# Patient Record
Sex: FEMALE | Race: ASIAN | NOT HISPANIC OR LATINO | Employment: UNEMPLOYED | URBAN - METROPOLITAN AREA
[De-identification: names, ages, dates, MRNs, and addresses within clinical notes are randomized per-mention and may not be internally consistent; named-entity substitution may affect disease eponyms.]

---

## 2024-09-17 ENCOUNTER — INITIAL PRENATAL (OUTPATIENT)
Dept: OBGYN | Facility: CLINIC | Age: 34
End: 2024-09-17
Payer: COMMERCIAL

## 2024-09-17 VITALS — WEIGHT: 104.8 LBS | SYSTOLIC BLOOD PRESSURE: 85 MMHG | DIASTOLIC BLOOD PRESSURE: 50 MMHG

## 2024-09-17 DIAGNOSIS — Z3A.16 16 WEEKS GESTATION OF PREGNANCY: ICD-10-CM

## 2024-09-17 PROCEDURE — 3074F SYST BP LT 130 MM HG: CPT | Performed by: OBSTETRICS & GYNECOLOGY

## 2024-09-17 PROCEDURE — 0501F PRENATAL FLOW SHEET: CPT | Performed by: OBSTETRICS & GYNECOLOGY

## 2024-09-17 PROCEDURE — 3078F DIAST BP <80 MM HG: CPT | Performed by: OBSTETRICS & GYNECOLOGY

## 2024-09-17 NOTE — PROGRESS NOTES
Pt here for NOB apt.     Last pap : has not had one, pt prefers to do pap at her PP visit  LMP = 5/28/24  HERMELINDA = 3/4/25  GA today = 16w0d  EPDS = 7  Pt is not interested in genetic testing - already had one done in [place].     Pt states she does not feel movement yet and wanted to know when she would start feeling movement. This is her first pregnancy.   Medical records with first trimester labs scanned in [place], labs were done in [place].     Phone/Pharm verified.   [phone]

## 2024-09-17 NOTE — PROGRESS NOTES
Establish Pregnancy Visit    CC: First OB Visit    HPI: Patient is a 34 y.o.  at 16 weeks based on her sure last menstrual period who presents for her first OB visit.  She is not yet feeling fetal movement.  She denies vaginal bleeding, denies nausea, denies vomiting.   She denies headaches, or urinary symptoms.  The patient had her initial prenatal care in [place].  She does have a 12-week ultrasound which confirms her dates.  Her prenatal labs also current with a normal NIPT as well.    DATING:     Procedure:     Findings:   Harmon intrauterine pregnancy @15 weeks 2 days on today's ultrasound.    Positive fetal cardiac activity      Impression:   Viable IUP @ 16w0d.  HERMELINDA by US of 25 by original 12-week ultrasound..  HERMELINDA by LMP: 3/ 4/25  Final HERMELINDA: 3/4/2025      GYN HX:   Last Pap: Never  Hx Moderate or Severe Dysplasia : no  Hx STD : no    OBSTETRIC HISTORY:  OB History    Para Term  AB Living   1             SAB IAB Ectopic Molar Multiple Live Births                    # Outcome Date GA Lbr Jesus Manuel/2nd Weight Sex Type Anes PTL Lv   1 Current                MEDICAL HISTORY:  History reviewed. No pertinent past medical history.    MEDICATIONS:  Current Outpatient Medications on File Prior to Visit   Medication Sig Dispense Refill    Prenatal MV-Min-Fe Fum-FA-DHA (PRENATAL 1 PO) Take  by mouth.       No current facility-administered medications on file prior to visit.       FAMILY HISTORY:  Family History   Problem Relation Age of Onset    Kidney stones Mother     Liver Cancer Maternal Grandmother     Lung Cancer Maternal Grandfather        SURGICAL HISTORY:  Past Surgical History:   Procedure Laterality Date    LACERATION REPAIR      right hand, tendon laceration    TONSILLECTOMY         ALLERGIES / REACTIONS:  Not on File             SOCIAL HISTORY:   reports that she has never smoked. She has never used smokeless tobacco. She reports that she does not drink alcohol and does not use  drugs.    ROS:   Gen: no fevers or chills, no significant weight loss or gain, excessive fatigue  Respiratory:  no cough or dyspnea  Cardiac:  no chest pain, no palpitations, no syncope  Breast: no breast discharge, pain, lump or skin changes  GI:  no heartburn, no abdominal pain, no nausea or vomiting  Urinary: no dysuria, urgency, frequency, incontinence   Psych: no depression or anxiety  Neuro: no migraines with aura, fainting spells, numbness or tingling  Extremities: no joint pain, persistently swollen ankles, recurrent leg cramps         PHYSICAL EXAMINATION:  Vital Signs: BP (!) 85/50   Wt 104 lb 12.8 oz   Constitutional: The patient is well developed and well nourished.  Psychiatric: Patient is oriented to time place and person.   Skin: No rash observed.  Neck: Neck appears symmetric. There are no masses or adenopathy present.  Heart: RRR without murmurs  Respiratory: normal effort  Abdomen: Soft, non-tender.  150  Pelvic: Patient declined    Extremeties: Legs are symmetric and without tenderness. There is no edema present.    ACOG SCREENING  Infection Prevention  1. High Risk For HIV: No 6. Rash Or Illness Since LMP: No     2. High Risk For Hepatitis B or C: No 7. History Of STD, GC, Chlamydia, HPV Syphilis: No     3. Live With Someone With TB Or Exposed To TB: No 8. Have a cat in the home?: No     4. Patient Or Partner Has A History Of Herpes: No 8a. Responsible for changing the litter?: No     5. History of Chicken Pox: Yes (Comment: MOB when she was a kid) 9. Other (See Comments Below): No   Comments: MOB is employed. FOB is involved. Pregnancy was planned.          Genetic Screening/Teratology Counseling- Includes patient, baby's father, or anyone in either family with:  Patient's age 35 years or older as of estimated date of delivery: Yes     Thalassemia (Italian, Greek, Mediterranean, or  background): MCV less than 80: No     Neural tube defect (Meningomyelocele, Spina bifida, or Anencephaly):  No     Congenital heart defect: No     Down syndrome: No     Scott-Sachs (Ashkenazi Oriental orthodox, Cajun, Spanish [place]): No     Canavan disease (Ashkenazi Oriental orthodox): No     Familial dysautonomia (Ashkenazi Oriental orthodox): No     Sickle cell disease or trait (): No     Hemophilia or other blood disorders: No     Muscular dystrophy: No    Cystic fibrosis: No     Cass's chorea: No     Mental retardation/autism: No     Other inherited genetic or chromosomal disorder: No     Maternal metabolic disorder (eg. Type 1 diabetes, PKU): No     Patient or baby's father had child with birth defects not listed above: No     Recurrent pregnancy loss, or a stillbirth: No     Medications (including supplements, vitamins, herbs, or OTC drugs)/illicit/recreational drugs/alcohol since last menstrual period: No                 ASSESSMENT AND PLAN:  34 y.o.  at 16 weeks based on LMP and a 12-week ultrasound.  An ultrasound was performed today which placed her at approximately 15 weeks 2 days which is consistent with her original due date.  The patient declines a pelvic exam at this point today and she was counseled regarding the recommendations for Pap smear for cervical cancer screening as well as other screening tests..  The patient was counseled regarding the course of prenatal care and she will be scheduled for a 4 week return OB visit.  She is also scheduled for an anatomy scan at 20 weeks as well.  Reassurance was provided regarding her lack of fetal movement.    Pregnancy Problems (from 24 to present)       No problems associated with this episode.            1. 16 weeks gestation of pregnancy    - US-OB 2ND 3RD TRI COMPLETE; Future      - PNL reviewed or ordered and std screening not completed   - Dating reviewed: Dated by sure LMP 12 wk US  - Discussed options for genetic/aneuploidy testing and information given for pt to consider.  This was done in [place]  - Discussed recommendation for flu, Covid vaccine during  pregnancy  - Discussed office policies, prenatal care timeline, weight gain, diet and activity.  - Taking PNV.  - Increase water intake and encouraged healthy nutrition. Encouraged moderate exercise may continue into final trimester.     Return in 4 weeks for next prenatal visit    Ham Dick M.D.

## 2024-10-16 ENCOUNTER — ROUTINE PRENATAL (OUTPATIENT)
Dept: OBGYN | Facility: CLINIC | Age: 34
End: 2024-10-16
Payer: COMMERCIAL

## 2024-10-16 VITALS — WEIGHT: 109 LBS | DIASTOLIC BLOOD PRESSURE: 51 MMHG | SYSTOLIC BLOOD PRESSURE: 100 MMHG

## 2024-10-16 DIAGNOSIS — Z34.01 ENCOUNTER FOR SUPERVISION OF NORMAL FIRST PREGNANCY IN FIRST TRIMESTER: ICD-10-CM

## 2024-10-16 PROCEDURE — 0502F SUBSEQUENT PRENATAL CARE: CPT | Performed by: STUDENT IN AN ORGANIZED HEALTH CARE EDUCATION/TRAINING PROGRAM

## 2024-10-16 PROCEDURE — 3078F DIAST BP <80 MM HG: CPT | Performed by: STUDENT IN AN ORGANIZED HEALTH CARE EDUCATION/TRAINING PROGRAM

## 2024-10-16 PROCEDURE — 3074F SYST BP LT 130 MM HG: CPT | Performed by: STUDENT IN AN ORGANIZED HEALTH CARE EDUCATION/TRAINING PROGRAM

## 2024-10-22 ENCOUNTER — HOSPITAL ENCOUNTER (OUTPATIENT)
Dept: RADIOLOGY | Facility: MEDICAL CENTER | Age: 34
End: 2024-10-22
Attending: OBSTETRICS & GYNECOLOGY
Payer: COMMERCIAL

## 2024-10-22 DIAGNOSIS — Z3A.16 16 WEEKS GESTATION OF PREGNANCY: ICD-10-CM

## 2024-10-22 PROCEDURE — 76805 OB US >/= 14 WKS SNGL FETUS: CPT

## 2024-11-21 ENCOUNTER — ROUTINE PRENATAL (OUTPATIENT)
Dept: OBGYN | Facility: CLINIC | Age: 34
End: 2024-11-21
Payer: COMMERCIAL

## 2024-11-21 VITALS
DIASTOLIC BLOOD PRESSURE: 56 MMHG | HEIGHT: 63 IN | BODY MASS INDEX: 20.39 KG/M2 | SYSTOLIC BLOOD PRESSURE: 98 MMHG | WEIGHT: 115.1 LBS

## 2024-11-21 DIAGNOSIS — Z11.3 ROUTINE SCREENING FOR STI (SEXUALLY TRANSMITTED INFECTION): ICD-10-CM

## 2024-11-21 DIAGNOSIS — Z34.02 ENCOUNTER FOR SUPERVISION OF NORMAL FIRST PREGNANCY IN SECOND TRIMESTER: ICD-10-CM

## 2024-11-21 DIAGNOSIS — G89.29 CHRONIC BILATERAL LOW BACK PAIN WITHOUT SCIATICA: ICD-10-CM

## 2024-11-21 DIAGNOSIS — M54.50 CHRONIC BILATERAL LOW BACK PAIN WITHOUT SCIATICA: ICD-10-CM

## 2024-11-21 PROCEDURE — 3074F SYST BP LT 130 MM HG: CPT | Performed by: FAMILY MEDICINE

## 2024-11-21 PROCEDURE — 0502F SUBSEQUENT PRENATAL CARE: CPT | Performed by: FAMILY MEDICINE

## 2024-11-21 PROCEDURE — 3078F DIAST BP <80 MM HG: CPT | Performed by: FAMILY MEDICINE

## 2024-11-21 NOTE — PROGRESS NOTES
Prenatal Clinic Note    Renetta Mcmahan is a 34 y.o. female  at 25w2d by LMP c/w 12w US who presents for prenatal care.    Subjective      HPI: Patient is doing well. She is here for routine prenatal care. We discussed back pain in pregnancy and she will continue using her pillow, walking, and consider using belly band.    Uterine contractions denies  Leakage of fluid denies  vaginal bleeding denies  fetal movement affirms    Review of Symptoms:  Gen: denies fevers/chills, denies changes in weight  Pulm: denies shortness of breath, denies cough  CV: denies chest pain, denies palpitations  GI: -nausea, -vomiting, denies diarrhea or constipation  : denies dysuria, denies vaginal discharge or odor  Skin: denies rash    Histories      Prenatal care with [facility] starting at 16 with following problems:  Patient Active Problem List    Diagnosis Date Noted    Chronic bilateral low back pain without sciatica 2024       History reviewed. No pertinent past medical history.  Past Surgical History:   Procedure Laterality Date    LACERATION REPAIR      right hand, tendon laceration    TONSILLECTOMY       Family History   Problem Relation Age of Onset    Kidney stones Mother     Liver Cancer Maternal Grandmother     Lung Cancer Maternal Grandfather      OB History    Para Term  AB Living   1             SAB IAB Ectopic Molar Multiple Live Births                    # Outcome Date GA Lbr Jesus Manuel/2nd Weight Sex Type Anes PTL Lv   1 Current              Social History     Socioeconomic History    Marital status:      Spouse name: Not on file    Number of children: Not on file    Years of education: Not on file    Highest education level: Not on file   Occupational History    Not on file   Tobacco Use    Smoking status: Never    Smokeless tobacco: Never   Vaping Use    Vaping status: Never Used   Substance and Sexual Activity    Alcohol use: Never    Drug use: Never    Sexual activity: Yes   Other Topics  "Concern    Not on file   Social History Narrative    Not on file     Social Drivers of Health     Financial Resource Strain: Not on file   Food Insecurity: Not on file   Transportation Needs: Not on file   Physical Activity: Not on file   Stress: Not on file   Social Connections: Not on file   Intimate Partner Violence: Not on file   Housing Stability: Not on file     Not on File     Current Outpatient Medications:     Prenatal MV-Min-Fe Fum-FA-DHA (PRENATAL 1 PO), Take  by mouth., Disp: , Rfl:     Objective:      BP 98/56   Ht 5' 3\"   Wt 115 lb 1.6 oz   LMP 2024 (Exact Date)   BMI 20.39 kg/m²     Gen: Alert and oriented, No apparent distress.  Lungs: Breathing comfortably on room air, no cough  CV: Extremities are warm and well perfused, no BLE edema  MSK: Normal movement of extremities, gait normal    SVE:    FH: 26  FHR: 140    Lab Review        Invalid input(s): \"CBC PLTDF\", \"HIV\", \"CHLAM\"     Assessment and Plan:     Renetta Mcmahan is a 34 y.o. female  at 25w1d by LMP c/w 12w US who presents for prenatal care.    #SIUP at 25w1d by 12wk US, HERMELINDA 3/4/25  - prenatal care with [facility]  - Continue prenatal vitamins  - Quad screen in [place], neg, in media  - ordered missing prenatal labs    #pre-preg 101 lb, BMI 17.9  - BMI<18.5: IOM weight gain goal 28-40 lb for entire pregnancy  - total weight gain so far: 115  - encourage daily exercise, at least 30 minutes  - continue healthy diet with mostly fruit and vegetables  - Increase water intake and encouraged healthy nutrition.   - Encouraged moderate exercise may continue into final trimester.     #Nausea/Vomiting  - important to stay hydrated each day and eat small frequent snacks to avoid having an empty stomach  - Reviewed starting treatment for routine N/V of pregnancy if present with B6 10mg TID and Unisom 10 mg nightly.   - Call for prescription if this is not adequately treating N/V.    #pre-eclampsia prophylaxis  - not recommend aspirin 81mg once daily " for duration of pregnancy  - risk factors: nulliparity    #rubella: ordered  - MMR vaccine to be given postpartum    #varicella: ordered  - varicella vaccine to be given postpartum    #HIV NR, Trep NR, HBsAg NR, Hep C NR, GCCT ordered    #blood type O+, antibody screen ordered    #postpartum contraception: TBD    #Healthcare Maintenance  - Tdap after 28wk  - flu vaccine recommended  - COVID vaccines/boosters recommended  - RSV vaccine recommended 32-36wk  - Continue routine dental care twice per year    1. Encounter for supervision of normal first pregnancy in second trimester  - CBC WITHOUT DIFFERENTIAL; Future  - T.PALLIDUM AB JADEN (SCREENING); Future  - GLUCOSE 1HR GESTATIONAL; Future  - ANTIBODY SCREEN; Future  - RUBELLA ABS IGG; Future  - VARICELLA ZOSTER IGG AB; Future  - Quantiferon Gold TB (PPD); Future    2. Chronic bilateral low back pain without sciatica    3. Routine screening for STI (sexually transmitted infection)  - Chlamydia/GC, PCR (Urine); Future       Return to clinic in 3 weeks for routine prenatal care    Araseli Gifford MD, MPH

## 2024-11-21 NOTE — PROGRESS NOTES
Pt here for OB follow-up  Reports +FM  No VB, LOF, or UC  Phone number: [phone]  Pharmacy verified with pt.  Pt states no concerns at this time     Midtrimester labs pended

## 2024-11-23 ENCOUNTER — HOSPITAL ENCOUNTER (OUTPATIENT)
Dept: LAB | Facility: MEDICAL CENTER | Age: 34
End: 2024-11-23
Attending: FAMILY MEDICINE
Payer: COMMERCIAL

## 2024-11-23 DIAGNOSIS — Z11.3 ROUTINE SCREENING FOR STI (SEXUALLY TRANSMITTED INFECTION): ICD-10-CM

## 2024-11-23 DIAGNOSIS — Z34.02 ENCOUNTER FOR SUPERVISION OF NORMAL FIRST PREGNANCY IN SECOND TRIMESTER: ICD-10-CM

## 2024-11-23 LAB
BLD GP AB SCN SERPL QL: NORMAL
ERYTHROCYTE [DISTWIDTH] IN BLOOD BY AUTOMATED COUNT: 51 FL (ref 35.9–50)
GLUCOSE 1H P 50 G GLC PO SERPL-MCNC: 151 MG/DL (ref 70–139)
HCT VFR BLD AUTO: 35.7 % (ref 37–47)
HGB BLD-MCNC: 11.6 G/DL (ref 12–16)
MCH RBC QN AUTO: 29.4 PG (ref 27–33)
MCHC RBC AUTO-ENTMCNC: 32.5 G/DL (ref 32.2–35.5)
MCV RBC AUTO: 90.6 FL (ref 81.4–97.8)
PLATELET # BLD AUTO: 280 K/UL (ref 164–446)
PMV BLD AUTO: 10 FL (ref 9–12.9)
RBC # BLD AUTO: 3.94 M/UL (ref 4.2–5.4)
RUBV AB SER QL: 327 IU/ML
T PALLIDUM AB SER QL IA: NORMAL
WBC # BLD AUTO: 12.6 K/UL (ref 4.8–10.8)

## 2024-11-23 PROCEDURE — 82950 GLUCOSE TEST: CPT

## 2024-11-23 PROCEDURE — 86780 TREPONEMA PALLIDUM: CPT

## 2024-11-23 PROCEDURE — 86762 RUBELLA ANTIBODY: CPT

## 2024-11-23 PROCEDURE — 86850 RBC ANTIBODY SCREEN: CPT

## 2024-11-23 PROCEDURE — 86787 VARICELLA-ZOSTER ANTIBODY: CPT

## 2024-11-23 PROCEDURE — 86480 TB TEST CELL IMMUN MEASURE: CPT

## 2024-11-23 PROCEDURE — 36415 COLL VENOUS BLD VENIPUNCTURE: CPT

## 2024-11-23 PROCEDURE — 87491 CHLMYD TRACH DNA AMP PROBE: CPT

## 2024-11-23 PROCEDURE — 85027 COMPLETE CBC AUTOMATED: CPT

## 2024-11-23 PROCEDURE — 87591 N.GONORRHOEAE DNA AMP PROB: CPT

## 2024-11-24 LAB
C TRACH DNA SPEC QL NAA+PROBE: NEGATIVE
N GONORRHOEA DNA SPEC QL NAA+PROBE: NEGATIVE
SPECIMEN SOURCE: NORMAL

## 2024-11-25 DIAGNOSIS — R73.09 GLUCOSE TOLERANCE TEST ABNORMAL: ICD-10-CM

## 2024-11-25 LAB
GAMMA INTERFERON BACKGROUND BLD IA-ACNC: 0.02 IU/ML
M TB IFN-G BLD-IMP: NEGATIVE
M TB IFN-G CD4+ BCKGRND COR BLD-ACNC: 0.04 IU/ML
MITOGEN IGNF BCKGRD COR BLD-ACNC: 0.58 IU/ML
QFT TB2 - NIL TBQ2: 0.05 IU/ML
VZV IGG SER IA-ACNC: 3 S/CO

## 2024-12-03 ENCOUNTER — HOSPITAL ENCOUNTER (OUTPATIENT)
Dept: LAB | Facility: MEDICAL CENTER | Age: 34
End: 2024-12-03
Attending: FAMILY MEDICINE
Payer: COMMERCIAL

## 2024-12-03 DIAGNOSIS — R73.09 GLUCOSE TOLERANCE TEST ABNORMAL: ICD-10-CM

## 2024-12-03 PROCEDURE — 82952 GTT-ADDED SAMPLES: CPT

## 2024-12-03 PROCEDURE — 36415 COLL VENOUS BLD VENIPUNCTURE: CPT

## 2024-12-03 PROCEDURE — 82951 GLUCOSE TOLERANCE TEST (GTT): CPT

## 2024-12-04 LAB
GLUCOSE 1H P CHAL SERPL-MCNC: 165 MG/DL (ref 65–180)
GLUCOSE 2H P CHAL SERPL-MCNC: 145 MG/DL (ref 65–155)
GLUCOSE 3H P CHAL SERPL-MCNC: 122 MG/DL (ref 65–140)
GLUCOSE BS SERPL-MCNC: 71 MG/DL (ref 65–95)

## 2024-12-19 ENCOUNTER — ROUTINE PRENATAL (OUTPATIENT)
Dept: OBGYN | Facility: CLINIC | Age: 34
End: 2024-12-19
Payer: COMMERCIAL

## 2024-12-19 VITALS — WEIGHT: 121.2 LBS | BODY MASS INDEX: 21.47 KG/M2 | DIASTOLIC BLOOD PRESSURE: 51 MMHG | SYSTOLIC BLOOD PRESSURE: 92 MMHG

## 2024-12-19 DIAGNOSIS — Z34.03 ENCOUNTER FOR SUPERVISION OF NORMAL FIRST PREGNANCY IN THIRD TRIMESTER: ICD-10-CM

## 2024-12-19 PROCEDURE — 90471 IMMUNIZATION ADMIN: CPT

## 2024-12-19 PROCEDURE — 3078F DIAST BP <80 MM HG: CPT | Performed by: STUDENT IN AN ORGANIZED HEALTH CARE EDUCATION/TRAINING PROGRAM

## 2024-12-19 PROCEDURE — 0502F SUBSEQUENT PRENATAL CARE: CPT | Performed by: STUDENT IN AN ORGANIZED HEALTH CARE EDUCATION/TRAINING PROGRAM

## 2024-12-19 PROCEDURE — 3074F SYST BP LT 130 MM HG: CPT | Performed by: STUDENT IN AN ORGANIZED HEALTH CARE EDUCATION/TRAINING PROGRAM

## 2024-12-19 PROCEDURE — 90715 TDAP VACCINE 7 YRS/> IM: CPT | Mod: JZ

## 2024-12-19 RX ORDER — FERROUS SULFATE 325(65) MG
325 TABLET ORAL DAILY
COMMUNITY

## 2024-12-19 NOTE — LETTER
"Count Your Baby's Movements  Another step to a healthy delivery    Renetta Tanner Mcmahan  [facility]  Dept: [phone]    How Many Weeks Pregnant? 29w2d    Date to Begin Counting: today              How to use this chart    One way for your physician to keep track of your baby's health is by knowing how often the baby moves (or \"kicks\") in your womb.  You can help your physician to do this by using this chart every day.    Every day, you should see how many hours it takes for your baby to move 10 times.  Start in the morning, as soon as you get up.    First, write down the time your baby moves until you get to 10.  Check off one box every time your baby moves until you get to 10.  Write down the time you finished counting in the last column.  Total how long it took to count up all 10 movements.  Finally, fill in the box that shows how long this took.  After counting 10 movements, you no longer have to count any more that day.  The next morning, just start counting again as soon as you get up.    What should you call a \"movement\"?  It is hard to say, because it will feel different from one mother to another and from one pregnancy to the next.  The important thing is that you count the movements the same way throughout your pregnancy.  If you have more questions, you should ask your physician.    Count carefully every day!  SAMPLE:  Week 28    How many hours did it take to feel 10 movements?       Start  Time     1     2     3     4     5     6     7     8     9     10   Finish Time   Mon 8:20           11:40   Tue Wed Thu               Fri               Sat               Sun                 IMPORTANT: You should contact your physician if it takes more than two hours for you to feel 10 movements.  Each morning, write down the time and start to count the movements of your baby.  Keep track by checking off one box every time you feel one movement.  When you have " "felt 10 \"kicks\", write down the time you finished counting in the last column.  Then fill in the   box (over the check nila) for the number of hours it took.  Be sure to read the complete instructions on the previous page.            "

## 2024-12-19 NOTE — LETTER
"Count Your Baby's Movements  Another step to a healthy delivery    Renetta Tanner Mcmahan  [facility]  Dept: [phone]    How Many Weeks Pregnant? 29w2d    Date to Begin Counting: Today 12/19/2024              How to use this chart    One way for your physician to keep track of your baby's health is by knowing how often the baby moves (or \"kicks\") in your womb.  You can help your physician to do this by using this chart every day.    Every day, you should see how many hours it takes for your baby to move 10 times.  Start in the morning, as soon as you get up.    First, write down the time your baby moves until you get to 10.  Check off one box every time your baby moves until you get to 10.  Write down the time you finished counting in the last column.  Total how long it took to count up all 10 movements.  Finally, fill in the box that shows how long this took.  After counting 10 movements, you no longer have to count any more that day.  The next morning, just start counting again as soon as you get up.    What should you call a \"movement\"?  It is hard to say, because it will feel different from one mother to another and from one pregnancy to the next.  The important thing is that you count the movements the same way throughout your pregnancy.  If you have more questions, you should ask your physician.    Count carefully every day!  SAMPLE:  Week 28    How many hours did it take to feel 10 movements?       Start  Time     1     2     3     4     5     6     7     8     9     10   Finish Time   Mon 8:20           11:40   Tue Wed Thu               Fri               Sat               Sun                 IMPORTANT: You should contact your physician if it takes more than two hours for you to feel 10 movements.  Each morning, write down the time and start to count the movements of your baby.  Keep track by checking off one box every time you feel one movement.  When " "you have felt 10 \"kicks\", write down the time you finished counting in the last column.  Then fill in the   box (over the check nila) for the number of hours it took.  Be sure to read the complete instructions on the previous page.            "

## 2024-12-19 NOTE — PROGRESS NOTES
Pt here today for OB follow up  Reports +FM  WT: 121.2 lb  BP: 92/51  Preferred pharmacy verified with pt.  MFM Appointment: not at this time   Pt states no complaints or concerns today   Good # [phone]  PANCHO sheet given and explained today   Tdap vaccine offered,   Pt declines BTL

## 2024-12-19 NOTE — LETTER
"Count Your Baby's Movements  Another step to a healthy delivery    Renetta Tanner Mcmahan  [facility]  Dept: [phone]    How Many Weeks Pregnant? 29w2d    Date to Begin Counting: Today               How to use this chart    One way for your physician to keep track of your baby's health is by knowing how often the baby moves (or \"kicks\") in your womb.  You can help your physician to do this by using this chart every day.    Every day, you should see how many hours it takes for your baby to move 10 times.  Start in the morning, as soon as you get up.    First, write down the time your baby moves until you get to 10.  Check off one box every time your baby moves until you get to 10.  Write down the time you finished counting in the last column.  Total how long it took to count up all 10 movements.  Finally, fill in the box that shows how long this took.  After counting 10 movements, you no longer have to count any more that day.  The next morning, just start counting again as soon as you get up.    What should you call a \"movement\"?  It is hard to say, because it will feel different from one mother to another and from one pregnancy to the next.  The important thing is that you count the movements the same way throughout your pregnancy.  If you have more questions, you should ask your physician.    Count carefully every day!  SAMPLE:  Week 28    How many hours did it take to feel 10 movements?       Start  Time     1     2     3     4     5     6     7     8     9     10   Finish Time   Mon 8:20           11:40   Tue Wed Thu               Fri               Sat               Sun                 IMPORTANT: You should contact your physician if it takes more than two hours for you to feel 10 movements.  Each morning, write down the time and start to count the movements of your baby.  Keep track by checking off one box every time you feel one movement.  When you have " "felt 10 \"kicks\", write down the time you finished counting in the last column.  Then fill in the   box (over the check nila) for the number of hours it took.  Be sure to read the complete instructions on the previous page.          "

## 2024-12-25 NOTE — PROGRESS NOTES
OB Visit Note - 29w2d     Pregnancy complicated by:  Patient Active Problem List   Diagnosis    Chronic bilateral low back pain without sciatica     SUBJECTIVE:  Renetta Mcmahan is a 34 y.o.,  at 29w2d who presents for pregnancy follow-up. She states the baby is moving.  Today she mentions some concerns regarding her size and weight gain.  She also is wondering if that is possible to have a female delivering provider.  The patient had an abnormal 1 hour glucose tolerance test and passed the 3-hour.    ROS:  She denies vaginal bleeding, leakage of fluid, or contractions.    She denies nausea or vomiting or headache    OBJECTIVE:  Vital Signs: BP 92/51   Wt 121 lb 3.2 oz   LMP 2024 (Exact Date)   BMI 21.47 kg/m²   Appearance/Psychiatric: to be in no distress  Constitutional: The patient is well nourished.  Respiratory:Respiratory effort is normal.  Gastrointestinal: Abdomen is soft, gravid, non-tendern,no rashes, heart tones are present, fundal height is consistent with dates  Extremities: no edema  FH: 29  FHR: 140s   Latest Reference Range & Units 24 09:20 24 09:33   WBC 4.8 - 10.8 K/uL 12.6 (H)    Hemoglobin 12.0 - 16.0 g/dL 11.6 (L)    Hematocrit 37.0 - 47.0 % 35.7 (L)    Platelet Count 164 - 446 K/uL 280    Baseline Glucose 65 - 95 mg/dL  71   Glucose 1 Hour 65 - 180 mg/dL  165   Glucose, Post Dose 70 - 139 mg/dL 151 (H)    Glucose 2 Hour 65 - 155 mg/dL  145   Glucose 3 Hour 65 - 140 mg/dL  122   (H): Data is abnormally high  (L): Data is abnormally low   Latest Reference Range & Units 24 08:10 24 08:15 24 09:20   QFT Gold Plus Negative    Negative   Rubella IgG Antibody IU/mL   327.00   Varicella Zoster IgG Ab S/CO   3.0   Syphilis, Treponemal Qual Non-Reactive    Non-Reactive   C. trachomatis by PCR Negative   Negative    Gc By Dna Probe Negative   Negative    Antibody Screen Scrn  NEG       1. Encounter for supervision of normal first pregnancy in second  trimester  Tdap Vaccine =>8YO IM        Renetta Mcmahan is a 34 y.o. female  at 29w2d.  Here for MADELINE.  Overall patient doing well.  Received Tdap vaccine.     Regarding weight gain concerns: she is gaining weight and has gained a total of about 20 pounds so far.  Her BMI was almost 18 to start and is normal for her ethnicity.  Her fundal height measurement is within normal limits today.  I reviewed that she is on track and gaining weight appropriately and measuring appropriately.  She does not have any difficulties keeping food down and therefore will likely have no difficulty gaining the appropriate amount of weight for her baby.  I discussed that if at any time her baby is measuring smaller during fundal height assessments  for example if she was measuring less than 27 today, then a growth scan would be indicated.    Regarding female delivering provider concerns: discussed that it is most likely she will have a female provider given that a majority of our providers are female, however there are some instances in which they may not be possible for example if there is only a male provider on or if the backup doctor for a midwife is needed and happens to be a male.  I encouraged her to make her wishes known when she presents for labor and delivery.  In all cases we do our very best to respect their wishes of our patient's desires.  Patient is understandable and agreeable.    Discussed routine travel restrictions with patient to include avoiding airlines after 35 weeks and 6 days mainly based on airline recommendations.  Reviewed that if going to be in a crowded place should always take routine precautions for infection to include wearing a mask and washing hands frequently.  Also reviewed that if she is going to be traveling for over 2 hours then it is recommended to get up and stretch legs and walk around if possible once every couple hours during longer travel.     -all questions answered and pt  agreeable to plan of care    The patient will follow up in 2 week(s). She was counseled to call or return for vaginal bleeding, regular contractions, leakage of fluid or decreased fetal movement.    Raisa Street DO, FACOG  [facility]

## 2025-01-06 ENCOUNTER — APPOINTMENT (OUTPATIENT)
Dept: OBGYN | Facility: CLINIC | Age: 35
End: 2025-01-06
Payer: COMMERCIAL

## 2025-02-03 ENCOUNTER — HOSPITAL ENCOUNTER (OUTPATIENT)
Facility: MEDICAL CENTER | Age: 35
End: 2025-02-03
Attending: NURSE PRACTITIONER
Payer: COMMERCIAL

## 2025-02-03 LAB — AMBIGUOUS DTTM AMBI4: NORMAL

## 2025-02-03 PROCEDURE — 87081 CULTURE SCREEN ONLY: CPT

## 2025-02-03 PROCEDURE — 87150 DNA/RNA AMPLIFIED PROBE: CPT

## 2025-02-05 LAB — GP B STREP DNA SPEC QL NAA+PROBE: NEGATIVE

## 2025-02-12 ENCOUNTER — OFFICE VISIT (OUTPATIENT)
Dept: MEDICAL GROUP | Facility: PHYSICIAN GROUP | Age: 35
End: 2025-02-12
Payer: COMMERCIAL

## 2025-02-12 VITALS
BODY MASS INDEX: 23.21 KG/M2 | HEART RATE: 98 BPM | DIASTOLIC BLOOD PRESSURE: 58 MMHG | OXYGEN SATURATION: 97 % | TEMPERATURE: 97.3 F | HEIGHT: 63 IN | WEIGHT: 131 LBS | SYSTOLIC BLOOD PRESSURE: 110 MMHG

## 2025-02-12 DIAGNOSIS — Z11.59 NEED FOR HEPATITIS C SCREENING TEST: ICD-10-CM

## 2025-02-12 DIAGNOSIS — Z00.00 HEALTHCARE MAINTENANCE: ICD-10-CM

## 2025-02-12 PROCEDURE — 3074F SYST BP LT 130 MM HG: CPT | Performed by: FAMILY MEDICINE

## 2025-02-12 PROCEDURE — 3078F DIAST BP <80 MM HG: CPT | Performed by: FAMILY MEDICINE

## 2025-02-12 PROCEDURE — 99203 OFFICE O/P NEW LOW 30 MIN: CPT | Performed by: FAMILY MEDICINE

## 2025-02-12 ASSESSMENT — PATIENT HEALTH QUESTIONNAIRE - PHQ9: CLINICAL INTERPRETATION OF PHQ2 SCORE: 0

## 2025-02-12 NOTE — PROGRESS NOTES
Verbal consent was acquired by the patient to use [employer] ambient listening note generation during this visit .    Subjective:     HPI:   History of Present Illness  The patient is a 34-year-old  1 para 0 female who presents today to [facility] care as a new patient. She is currently 37 weeks pregnant with her first child, a male, and has no specific complaints at this time. She is due for regular annual screening and multiple vaccinations, which will be discussed during today's visit.    The patient is under the obstetric care of Dr. Lashell Sutton and is planning for a natural vaginal delivery. An ultrasound is scheduled for today. She has gained approximately 30 pounds during her pregnancy and currently weighs 131 pounds. She does not plan to use contraception postpartum. She has experienced some lumbar pain during her pregnancy, which has improved with ambulation. Her current medications include prenatal vitamins, iron, and vitamin C, with no other medications reported. She has no known drug allergies. The patient denies any history of tobacco or nicotine use, recreational drug use, or alcohol abuse. She has not previously undergone a Pap smear. She does not regularly visit a dentist or ophthalmologist and has not followed up with any specialists other than her obstetrician-gynecologist. Her pulse rate typically ranges from the high 60s to 70s, and her blood pressure is usually in the 90s/60s, indicating normotension. She reports no peripheral edema or symptoms suggestive of preeclampsia. Her 1-hour glucose test was slightly elevated, but she passed the 3-hour glucose tolerance test.    Supplemental Information  Her surgical history includes a tonsillectomy and repair of a right-hand laceration.    SOCIAL HISTORY  She reports no history of smoking, alcohol intake, and drug use.    FAMILY HISTORY  Her maternal grandparents both had liver and lung issues. Her mother had kidney stones. Her father is  "healthy. She has one brother who is healthy. No family history of heart disease, high cholesterol, high blood pressure, diabetes, breast cancer, ovarian cancer, and colorectal cancer.    ALLERGIES  The patient has no known allergies.    MEDICATIONS  Current: Prenatal vitamins, iron, vitamin C    Health Maintenance: Completed    Objective:     Exam:  /58 (BP Location: Right arm, Patient Position: Sitting, BP Cuff Size: Adult)   Pulse 98   Temp 36.3 °C (97.3 °F) (Temporal)   Ht 1.6 m (5' 3\")   Wt 59.4 kg (131 lb)   LMP 05/28/2024 (Exact Date)   SpO2 97%   BMI 23.21 kg/m²  Body mass index is 23.21 kg/m².    Physical Exam  Vitals reviewed.   Constitutional:       Appearance: Normal appearance.   HENT:      Head: Normocephalic and atraumatic.      Right Ear: External ear normal.      Left Ear: External ear normal.      Nose: Nose normal.   Cardiovascular:      Rate and Rhythm: Normal rate and regular rhythm.      Pulses: Normal pulses.      Heart sounds: Normal heart sounds. No murmur heard.  Pulmonary:      Effort: Pulmonary effort is normal. No respiratory distress.      Breath sounds: Normal breath sounds. No wheezing.   Abdominal:      General: Abdomen is flat.      Palpations: Abdomen is soft.   Skin:     General: Skin is warm and dry.   Neurological:      Mental Status: She is alert and oriented to person, place, and time.   Psychiatric:         Mood and Affect: Mood normal.         Behavior: Behavior normal.             Results      Assessment & Plan:     1. Healthcare maintenance  CBC WITHOUT DIFFERENTIAL    TSH WITH REFLEX TO FT4    HEMOGLOBIN A1C    Lipid Profile    HEP C VIRUS ANTIBODY    Comp Metabolic Panel      2. Need for hepatitis C screening test  HEP C VIRUS ANTIBODY          Assessment & Plan  1. Health maintenance.  Her vital signs are within normal limits, with the exception of a slightly elevated pulse rate, which is not a cause for concern. Her oxygen saturation levels are " satisfactory, and she is afebrile. Her 1-hour glucose tolerance test showed a slight elevation, but she successfully passed the 3-hour test, indicating no gestational diabetes. She has no signs of ankle edema or preeclampsia. She is advised to maintain a nutritious diet for her and the baby's health. She is also encouraged to undergo a Pap smear with her OB-GYN post-delivery. Regular dental cleanings are recommended to prevent potential systemic infections. Annual ophthalmological check-ups are also suggested. A comprehensive panel of laboratory tests will be ordered, including thyroid function, A1c, electrolytes, kidney function, liver function, CBC, cholesterol, and hepatitis C screening. These tests can be conducted post-delivery. She is advised to fast for 8 to 10 hours prior to the lab work.        Return in about 1 year (around 2/12/2026) for Annual/wellness visit.    Please note that this dictation was created using voice recognition software. I have made every reasonable attempt to correct obvious errors, but I expect that there are errors of grammar and possibly content that I did not discover before finalizing the note.    Vi Lyons MD  Family Medicine and Non - Operative Sports Medicine   [facility] [address]

## 2025-03-01 ENCOUNTER — ANESTHESIA EVENT (OUTPATIENT)
Dept: ANESTHESIOLOGY | Facility: MEDICAL CENTER | Age: 35
End: 2025-03-01
Payer: COMMERCIAL

## 2025-03-01 ENCOUNTER — HOSPITAL ENCOUNTER (INPATIENT)
Facility: MEDICAL CENTER | Age: 35
LOS: 2 days | End: 2025-03-03
Attending: OBSTETRICS & GYNECOLOGY | Admitting: OBSTETRICS & GYNECOLOGY
Payer: COMMERCIAL

## 2025-03-01 ENCOUNTER — ANESTHESIA (OUTPATIENT)
Dept: ANESTHESIOLOGY | Facility: MEDICAL CENTER | Age: 35
End: 2025-03-01
Payer: COMMERCIAL

## 2025-03-01 LAB
BASOPHILS # BLD AUTO: 0.4 % (ref 0–1.8)
BASOPHILS # BLD: 0.06 K/UL (ref 0–0.12)
EOSINOPHIL # BLD AUTO: 0.03 K/UL (ref 0–0.51)
EOSINOPHIL NFR BLD: 0.2 % (ref 0–6.9)
ERYTHROCYTE [DISTWIDTH] IN BLOOD BY AUTOMATED COUNT: 47.1 FL (ref 35.9–50)
HCT VFR BLD AUTO: 47 % (ref 37–47)
HGB BLD-MCNC: 15.2 G/DL (ref 12–16)
HOLDING TUBE BB 8507: NORMAL
IMM GRANULOCYTES # BLD AUTO: 0.15 K/UL (ref 0–0.11)
IMM GRANULOCYTES NFR BLD AUTO: 1 % (ref 0–0.9)
LYMPHOCYTES # BLD AUTO: 1.41 K/UL (ref 1–4.8)
LYMPHOCYTES NFR BLD: 9.1 % (ref 22–41)
MCH RBC QN AUTO: 29.1 PG (ref 27–33)
MCHC RBC AUTO-ENTMCNC: 32.3 G/DL (ref 32.2–35.5)
MCV RBC AUTO: 90 FL (ref 81.4–97.8)
MONOCYTES # BLD AUTO: 0.68 K/UL (ref 0–0.85)
MONOCYTES NFR BLD AUTO: 4.4 % (ref 0–13.4)
NEUTROPHILS # BLD AUTO: 13.1 K/UL (ref 1.82–7.42)
NEUTROPHILS NFR BLD: 84.9 % (ref 44–72)
NRBC # BLD AUTO: 0 K/UL
NRBC BLD-RTO: 0 /100 WBC (ref 0–0.2)
PLATELET # BLD AUTO: 221 K/UL (ref 164–446)
PMV BLD AUTO: 10 FL (ref 9–12.9)
RBC # BLD AUTO: 5.22 M/UL (ref 4.2–5.4)
T PALLIDUM AB SER QL IA: NORMAL
WBC # BLD AUTO: 15.4 K/UL (ref 4.8–10.8)

## 2025-03-01 PROCEDURE — 700111 HCHG RX REV CODE 636 W/ 250 OVERRIDE (IP): Performed by: ANESTHESIOLOGY

## 2025-03-01 PROCEDURE — 0KQM0ZZ REPAIR PERINEUM MUSCLE, OPEN APPROACH: ICD-10-PCS | Performed by: OBSTETRICS & GYNECOLOGY

## 2025-03-01 PROCEDURE — 700105 HCHG RX REV CODE 258: Performed by: OBSTETRICS & GYNECOLOGY

## 2025-03-01 PROCEDURE — 700101 HCHG RX REV CODE 250: Performed by: ANESTHESIOLOGY

## 2025-03-01 PROCEDURE — 770002 HCHG ROOM/CARE - OB PRIVATE (112)

## 2025-03-01 PROCEDURE — 59409 OBSTETRICAL CARE: CPT

## 2025-03-01 PROCEDURE — 85025 COMPLETE CBC W/AUTO DIFF WBC: CPT

## 2025-03-01 PROCEDURE — 303615 HCHG EPIDURAL/SPINAL ANESTHESIA FOR LABOR

## 2025-03-01 PROCEDURE — 36415 COLL VENOUS BLD VENIPUNCTURE: CPT

## 2025-03-01 PROCEDURE — 700111 HCHG RX REV CODE 636 W/ 250 OVERRIDE (IP): Mod: JZ

## 2025-03-01 PROCEDURE — 700102 HCHG RX REV CODE 250 W/ 637 OVERRIDE(OP): Performed by: OBSTETRICS & GYNECOLOGY

## 2025-03-01 PROCEDURE — 304965 HCHG RECOVERY SERVICES

## 2025-03-01 PROCEDURE — 302449 STATCHG TRIAGE ONLY (STATISTIC)

## 2025-03-01 PROCEDURE — 86780 TREPONEMA PALLIDUM: CPT

## 2025-03-01 PROCEDURE — A9270 NON-COVERED ITEM OR SERVICE: HCPCS | Performed by: OBSTETRICS & GYNECOLOGY

## 2025-03-01 PROCEDURE — 700105 HCHG RX REV CODE 258: Performed by: ANESTHESIOLOGY

## 2025-03-01 PROCEDURE — 700111 HCHG RX REV CODE 636 W/ 250 OVERRIDE (IP): Mod: JZ | Performed by: OBSTETRICS & GYNECOLOGY

## 2025-03-01 RX ORDER — MISOPROSTOL 200 UG/1
600 TABLET ORAL
Status: DISCONTINUED | OUTPATIENT
Start: 2025-03-01 | End: 2025-03-03 | Stop reason: HOSPADM

## 2025-03-01 RX ORDER — LIDOCAINE HYDROCHLORIDE 10 MG/ML
20 INJECTION, SOLUTION INFILTRATION; PERINEURAL
Status: DISCONTINUED | OUTPATIENT
Start: 2025-03-01 | End: 2025-03-01 | Stop reason: HOSPADM

## 2025-03-01 RX ORDER — LIDOCAINE HYDROCHLORIDE AND EPINEPHRINE 15; 5 MG/ML; UG/ML
INJECTION, SOLUTION EPIDURAL
Status: COMPLETED | OUTPATIENT
Start: 2025-03-01 | End: 2025-03-01

## 2025-03-01 RX ORDER — IBUPROFEN 800 MG/1
800 TABLET, FILM COATED ORAL
Status: DISCONTINUED | OUTPATIENT
Start: 2025-03-01 | End: 2025-03-01 | Stop reason: HOSPADM

## 2025-03-01 RX ORDER — SODIUM CHLORIDE, SODIUM LACTATE, POTASSIUM CHLORIDE, CALCIUM CHLORIDE 600; 310; 30; 20 MG/100ML; MG/100ML; MG/100ML; MG/100ML
INJECTION, SOLUTION INTRAVENOUS CONTINUOUS
Status: DISCONTINUED | OUTPATIENT
Start: 2025-03-01 | End: 2025-03-03 | Stop reason: HOSPADM

## 2025-03-01 RX ORDER — VITAMIN A ACETATE, BETA CAROTENE, ASCORBIC ACID, CHOLECALCIFEROL, .ALPHA.-TOCOPHEROL ACETATE, DL-, THIAMINE MONONITRATE, RIBOFLAVIN, NIACINAMIDE, PYRIDOXINE HYDROCHLORIDE, FOLIC ACID, CYANOCOBALAMIN, CALCIUM CARBONATE, FERROUS FUMARATE, ZINC OXIDE, CUPRIC OXIDE 3080; 12; 120; 400; 1; 1.84; 3; 20; 22; 920; 25; 200; 27; 10; 2 [IU]/1; UG/1; MG/1; [IU]/1; MG/1; MG/1; MG/1; MG/1; MG/1; [IU]/1; MG/1; MG/1; MG/1; MG/1; MG/1
1 TABLET, FILM COATED ORAL EVERY MORNING
Status: DISCONTINUED | OUTPATIENT
Start: 2025-03-02 | End: 2025-03-03 | Stop reason: HOSPADM

## 2025-03-01 RX ORDER — SODIUM CHLORIDE, SODIUM LACTATE, POTASSIUM CHLORIDE, CALCIUM CHLORIDE 600; 310; 30; 20 MG/100ML; MG/100ML; MG/100ML; MG/100ML
2000 INJECTION, SOLUTION INTRAVENOUS PRN
Status: DISCONTINUED | OUTPATIENT
Start: 2025-03-01 | End: 2025-03-03 | Stop reason: HOSPADM

## 2025-03-01 RX ORDER — HALOPERIDOL 5 MG/ML
1 INJECTION INTRAMUSCULAR
Status: CANCELLED | OUTPATIENT
Start: 2025-03-01

## 2025-03-01 RX ORDER — TERBUTALINE SULFATE 1 MG/ML
0.25 INJECTION SUBCUTANEOUS
Status: DISCONTINUED | OUTPATIENT
Start: 2025-03-01 | End: 2025-03-01 | Stop reason: HOSPADM

## 2025-03-01 RX ORDER — CARBOPROST TROMETHAMINE 250 UG/ML
250 INJECTION, SOLUTION INTRAMUSCULAR
Status: DISCONTINUED | OUTPATIENT
Start: 2025-03-01 | End: 2025-03-03 | Stop reason: HOSPADM

## 2025-03-01 RX ORDER — OXYTOCIN 10 [USP'U]/ML
10 INJECTION, SOLUTION INTRAMUSCULAR; INTRAVENOUS
Status: DISCONTINUED | OUTPATIENT
Start: 2025-03-01 | End: 2025-03-01 | Stop reason: HOSPADM

## 2025-03-01 RX ORDER — ACETAMINOPHEN 500 MG
1000 TABLET ORAL EVERY 6 HOURS PRN
Status: DISCONTINUED | OUTPATIENT
Start: 2025-03-01 | End: 2025-03-03 | Stop reason: HOSPADM

## 2025-03-01 RX ORDER — MISOPROSTOL 200 UG/1
TABLET ORAL
Status: ACTIVE
Start: 2025-03-01 | End: 2025-03-02

## 2025-03-01 RX ORDER — OXYCODONE HYDROCHLORIDE 5 MG/1
5 TABLET ORAL EVERY 4 HOURS PRN
Status: DISCONTINUED | OUTPATIENT
Start: 2025-03-01 | End: 2025-03-03 | Stop reason: HOSPADM

## 2025-03-01 RX ORDER — IBUPROFEN 800 MG/1
800 TABLET, FILM COATED ORAL EVERY 8 HOURS PRN
Status: DISCONTINUED | OUTPATIENT
Start: 2025-03-01 | End: 2025-03-03 | Stop reason: HOSPADM

## 2025-03-01 RX ORDER — ONDANSETRON 2 MG/ML
INJECTION INTRAMUSCULAR; INTRAVENOUS
Status: COMPLETED
Start: 2025-03-01 | End: 2025-03-01

## 2025-03-01 RX ORDER — SODIUM CHLORIDE, SODIUM LACTATE, POTASSIUM CHLORIDE, AND CALCIUM CHLORIDE .6; .31; .03; .02 G/100ML; G/100ML; G/100ML; G/100ML
1000 INJECTION, SOLUTION INTRAVENOUS
Status: COMPLETED | OUTPATIENT
Start: 2025-03-01 | End: 2025-03-01

## 2025-03-01 RX ORDER — BISACODYL 10 MG
10 SUPPOSITORY, RECTAL RECTAL PRN
Status: COMPLETED | OUTPATIENT
Start: 2025-03-01 | End: 2025-03-03

## 2025-03-01 RX ORDER — EPHEDRINE SULFATE 50 MG/ML
5 INJECTION, SOLUTION INTRAVENOUS
Status: DISCONTINUED | OUTPATIENT
Start: 2025-03-01 | End: 2025-03-01 | Stop reason: HOSPADM

## 2025-03-01 RX ORDER — DOCUSATE SODIUM 100 MG/1
100 CAPSULE, LIQUID FILLED ORAL 2 TIMES DAILY PRN
Status: DISCONTINUED | OUTPATIENT
Start: 2025-03-01 | End: 2025-03-03 | Stop reason: HOSPADM

## 2025-03-01 RX ORDER — ONDANSETRON 2 MG/ML
4 INJECTION INTRAMUSCULAR; INTRAVENOUS
Status: CANCELLED | OUTPATIENT
Start: 2025-03-01

## 2025-03-01 RX ORDER — DIPHENHYDRAMINE HYDROCHLORIDE 50 MG/ML
12.5 INJECTION, SOLUTION INTRAMUSCULAR; INTRAVENOUS
Status: CANCELLED | OUTPATIENT
Start: 2025-03-01

## 2025-03-01 RX ORDER — ROPIVACAINE HYDROCHLORIDE 2 MG/ML
INJECTION, SOLUTION EPIDURAL; INFILTRATION; PERINEURAL CONTINUOUS
Status: DISCONTINUED | OUTPATIENT
Start: 2025-03-01 | End: 2025-03-03 | Stop reason: HOSPADM

## 2025-03-01 RX ORDER — SODIUM CHLORIDE, SODIUM LACTATE, POTASSIUM CHLORIDE, AND CALCIUM CHLORIDE .6; .31; .03; .02 G/100ML; G/100ML; G/100ML; G/100ML
250 INJECTION, SOLUTION INTRAVENOUS PRN
Status: DISCONTINUED | OUTPATIENT
Start: 2025-03-01 | End: 2025-03-01 | Stop reason: HOSPADM

## 2025-03-01 RX ORDER — METHYLERGONOVINE MALEATE 0.2 MG/ML
0.2 INJECTION INTRAVENOUS
Status: DISCONTINUED | OUTPATIENT
Start: 2025-03-01 | End: 2025-03-03 | Stop reason: HOSPADM

## 2025-03-01 RX ORDER — ACETAMINOPHEN 500 MG
1000 TABLET ORAL
Status: DISCONTINUED | OUTPATIENT
Start: 2025-03-01 | End: 2025-03-01 | Stop reason: HOSPADM

## 2025-03-01 RX ORDER — ONDANSETRON 2 MG/ML
4 INJECTION INTRAMUSCULAR; INTRAVENOUS EVERY 4 HOURS PRN
Status: DISCONTINUED | OUTPATIENT
Start: 2025-03-01 | End: 2025-03-03 | Stop reason: HOSPADM

## 2025-03-01 RX ADMIN — SODIUM CHLORIDE, POTASSIUM CHLORIDE, SODIUM LACTATE AND CALCIUM CHLORIDE: 600; 310; 30; 20 INJECTION, SOLUTION INTRAVENOUS at 13:33

## 2025-03-01 RX ADMIN — ROPIVACAINE HYDROCHLORIDE: 2 INJECTION, SOLUTION EPIDURAL; INFILTRATION at 11:05

## 2025-03-01 RX ADMIN — SODIUM CHLORIDE, POTASSIUM CHLORIDE, SODIUM LACTATE AND CALCIUM CHLORIDE 1000 ML: 600; 310; 30; 20 INJECTION, SOLUTION INTRAVENOUS at 10:57

## 2025-03-01 RX ADMIN — LIDOCAINE HYDROCHLORIDE,EPINEPHRINE BITARTRATE 5 ML: 15; .005 INJECTION, SOLUTION EPIDURAL; INFILTRATION; INTRACAUDAL; PERINEURAL at 11:19

## 2025-03-01 RX ADMIN — OXYTOCIN 10 UNITS: 10 INJECTION, SOLUTION INTRAMUSCULAR; INTRAVENOUS at 19:38

## 2025-03-01 RX ADMIN — OXYTOCIN 10 UNITS: 10 INJECTION, SOLUTION INTRAMUSCULAR; INTRAVENOUS at 19:20

## 2025-03-01 RX ADMIN — FENTANYL CITRATE 100 MCG: 50 INJECTION, SOLUTION INTRAMUSCULAR; INTRAVENOUS at 09:07

## 2025-03-01 RX ADMIN — ACETAMINOPHEN 1000 MG: 500 TABLET ORAL at 22:43

## 2025-03-01 RX ADMIN — ONDANSETRON 4 MG: 2 INJECTION INTRAMUSCULAR; INTRAVENOUS at 09:39

## 2025-03-01 SDOH — ECONOMIC STABILITY: TRANSPORTATION INSECURITY
IN THE PAST 12 MONTHS, HAS LACK OF RELIABLE TRANSPORTATION KEPT YOU FROM MEDICAL APPOINTMENTS, MEETINGS, WORK OR FROM GETTING THINGS NEEDED FOR DAILY LIVING?: NO

## 2025-03-01 SDOH — ECONOMIC STABILITY: TRANSPORTATION INSECURITY
IN THE PAST 12 MONTHS, HAS THE LACK OF TRANSPORTATION KEPT YOU FROM MEDICAL APPOINTMENTS OR FROM GETTING MEDICATIONS?: NO

## 2025-03-01 ASSESSMENT — LIFESTYLE VARIABLES
AVERAGE NUMBER OF DAYS PER WEEK YOU HAVE A DRINK CONTAINING ALCOHOL: 0
ALCOHOL_USE: NO
HAVE YOU EVER FELT YOU SHOULD CUT DOWN ON YOUR DRINKING: NO
TOTAL SCORE: 0
EVER HAD A DRINK FIRST THING IN THE MORNING TO STEADY YOUR NERVES TO GET RID OF A HANGOVER: NO
EVER FELT BAD OR GUILTY ABOUT YOUR DRINKING: NO
ON A TYPICAL DAY WHEN YOU DRINK ALCOHOL HOW MANY DRINKS DO YOU HAVE: 0
HOW MANY TIMES IN THE PAST YEAR HAVE YOU HAD 5 OR MORE DRINKS IN A DAY: 0
TOTAL SCORE: 0
TOTAL SCORE: 0
HAVE PEOPLE ANNOYED YOU BY CRITICIZING YOUR DRINKING: NO
CONSUMPTION TOTAL: NEGATIVE

## 2025-03-01 ASSESSMENT — SOCIAL DETERMINANTS OF HEALTH (SDOH)
WITHIN THE LAST YEAR, HAVE YOU BEEN HUMILIATED OR EMOTIONALLY ABUSED IN OTHER WAYS BY YOUR PARTNER OR EX-PARTNER?: NO
WITHIN THE PAST 12 MONTHS, YOU WORRIED THAT YOUR FOOD WOULD RUN OUT BEFORE YOU GOT THE MONEY TO BUY MORE: NEVER TRUE
WITHIN THE LAST YEAR, HAVE YOU BEEN KICKED, HIT, SLAPPED, OR OTHERWISE PHYSICALLY HURT BY YOUR PARTNER OR EX-PARTNER?: NO
WITHIN THE LAST YEAR, HAVE TO BEEN RAPED OR FORCED TO HAVE ANY KIND OF SEXUAL ACTIVITY BY YOUR PARTNER OR EX-PARTNER?: NO
WITHIN THE LAST YEAR, HAVE YOU BEEN AFRAID OF YOUR PARTNER OR EX-PARTNER?: NO
IN THE PAST 12 MONTHS, HAS THE ELECTRIC, GAS, OIL, OR WATER COMPANY THREATENED TO SHUT OFF SERVICE IN YOUR HOME?: NO
WITHIN THE PAST 12 MONTHS, THE FOOD YOU BOUGHT JUST DIDN'T LAST AND YOU DIDN'T HAVE MONEY TO GET MORE: NEVER TRUE

## 2025-03-01 ASSESSMENT — PAIN DESCRIPTION - PAIN TYPE
TYPE: ACUTE PAIN

## 2025-03-01 ASSESSMENT — PAIN SCALES - GENERAL: PAIN_LEVEL: 0

## 2025-03-01 ASSESSMENT — PATIENT HEALTH QUESTIONNAIRE - PHQ9
2. FEELING DOWN, DEPRESSED, IRRITABLE, OR HOPELESS: NOT AT ALL
SUM OF ALL RESPONSES TO PHQ9 QUESTIONS 1 AND 2: 0
1. LITTLE INTEREST OR PLEASURE IN DOING THINGS: NOT AT ALL

## 2025-03-01 NOTE — PROGRESS NOTES
0715: Pt is a  at 39.4 weeks today, pt presents to L&D with c/o painful Uc's every 5 minutes beginning at 0400 today. Pt reports +FM, denies VB/LOF at this time. EFM and TOCO applied, VS taken, pt comfortable in bed at this time. SVE as seen in flowsheets.     0720: This RN updated Dr Kaur on pt status/VS/FHT/SVE. Admit orders received, see orders for details.     0815: Report given to Chanelle SANCHEZ, POC discussed.

## 2025-03-01 NOTE — ANESTHESIA PROCEDURE NOTES
Epidural Block    Date/Time: 3/1/2025 10:49 AM    Performed by: Jermaine Rowan M.D.  Authorized by: Jermaine Rowan M.D.    Patient Location:  OB  Start Time:  3/1/2025 10:49 AM  End Time:  3/1/2025 11:13 AM  Reason for Block: labor analgesia    patient identified, IV checked, site marked, risks and benefits discussed, surgical consent, monitors and equipment checked and pre-op evaluation    Patient Position:  Sitting  Prep: ChloraPrep, patient draped and sterile technique    Monitoring:  Blood pressure, continuous pulse oximetry and heart rate  Approach:  Midline  Location:  L3-L4  Injection Technique:  REMBERTO saline  Skin infiltration:  Lidocaine  Strength:  1%  Dose:  5ml  Needle Type:  Tuohy  Needle Gauge:  17 G  Needle Length:  3.5 in  Loss of resistance::  5  Catheter Size:  19 G  Catheter at Skin Depth:  10  Test Dose Result:  Negative   Single pass ;  25 g epncil poitn ot csf.  0.2 ropiv injected  2ml      
DISPLAY PLAN FREE TEXT

## 2025-03-01 NOTE — CARE PLAN
The patient is Stable - Low risk of patient condition declining or worsening    Shift Goals  Clinical Goals: cervical dilation and effacement  Patient Goals: pain control with epidural  Family Goals: stay informed    Progress made toward(s) clinical / shift goals:    Problem: Knowledge Deficit - L&D  Goal: Patient and family/caregivers will demonstrate understanding of plan of care, disease process/condition, diagnostic tests and medications  Outcome: Progressing  Note: Pt and support educated on labor process. Both educated using terminology consistent with their level of knowledge. Both able to verbalize basic understanding of fetal monitoring      Problem: Pain  Goal: Patient's pain will be alleviated or reduced to the patient’s comfort goal  Outcome: Progressing  Note: Pt desires epidural for pain control. IV pain medication used with success until anesthesia available for epidural placement        Patient is not progressing towards the following goals:

## 2025-03-01 NOTE — H&P
"Labor and Delivery History and Physical    CC: Contractions    HPI: Renetta Mcmahan is a 34-year-old  who presented to labor and delivery at 39 weeks 4 days with complaints of contractions.  She denies bleeding or leaking fluid.  She reports good fetal movement.  In OB triage she was found to be 4 cm dilated and regularly cheryl.    She received her prenatal care with Dr. Sutton.  She was a transfer of care at 31 weeks from [facility].  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  She had an anatomic scan completed by HR PC at 32 weeks and 1 day.  It was unremarkable.  She had follow-up biometry at 37 weeks and 1 day which demonstrated an estimated fetal weight in the 27th percentile with AC in the 15th percentile.    ROS: All other systems were reviewed and were found to be negative    PMH: Anemia  PSH: Tonsillectomy, tendon repair on her right hand  OB Hx: This is her first pregnancy  GYN Hx: She denies history sexually-transmitted infection including herpes simplex virus for herself or her spouse.  She does not have a documented Pap smear  FH: Liver cancer, high cholesterol, kidney stones, osteoporosis  SH: Denies tobacco, alcohol, or illicit drug use  Medications: Prenatal vitamins  Allergies: NKDA    /68   Pulse 65   Temp 36.1 °C (96.9 °F) (Temporal)   Resp 16   Ht 1.6 m (5' 2.99\")   Wt 60.6 kg (133 lb 9.6 oz)   LMP 2024 (Exact Date)   SpO2 98%   BMI 23.67 kg/m²     General: No apparent distress  Abdomen: Gravid, nontender  Extremities: No edema    FHT: Baseline of 115's, moderate variability present, accelerations present, decelerations absent    Pertinent lab results  ABO O+  GTT: 1 hour abnormal but 3-hour within normal limits  GBS negative  HIV negative  Hep B negative  Hep C negative  RPR negative  Rubella immune    Assessment:  Term IUP at 39 weeks 4 days  Labor    Plan:  Admit to labor and delivery for management of labor.  May have an epidural on " demand.  Anticipate vaginal delivery.    Herbie Kaur M.D.

## 2025-03-01 NOTE — PROGRESS NOTES
OB Progress Note    Comfortable with epidural. Started leaking clear fluid in triage. FHT with baseline of 120's. Moderate variability present. Accelerations present. Decelerations absent. [place] with contractions every 3 min. Anticipate vaginal delivery.

## 2025-03-01 NOTE — ANESTHESIA PREPROCEDURE EVALUATION
Date: 03/01/25  Procedure: Labor Epidural         Relevant Problems   No relevant active problems       Physical Exam    Airway   Mallampati: II  TM distance: >3 FB  Neck ROM: full       Cardiovascular - normal exam  Rhythm: regular  Rate: normal  (-) murmur     Dental - normal exam           Pulmonary - normal exam  Breath sounds clear to auscultation     Abdominal    Neurological - normal exam                   Anesthesia Plan    ASA 2       Plan - epidural   Neuraxial block will be labor analgesia                  Pertinent diagnostic labs and testing reviewed    Informed Consent:    Anesthetic plan and risks discussed with patient.

## 2025-03-01 NOTE — PROGRESS NOTES
OB Progress Note    No complaints. FHT with baseline of 130's. Moderate variability present. Accelerations present. Decelerations absent. [place] with contractions every 3 min. Discussed r/b of AROM of a palpable forebag. She stated understanding and desired to proceed. Performed clear. SVE 8/C/-1. Anticipate vaginal delivery.

## 2025-03-01 NOTE — PROGRESS NOTES
0815- Report from DANIEL Carrion. Care assumed.     1900- Report to DANIEL Corado. Care relinquished.

## 2025-03-01 NOTE — ADDENDUM NOTE
Problem: At Risk for Falls  Goal: Patient does not fall  Outcome: Monitoring/Evaluating progress  Goal: Patient takes action to control fall-related risks  Outcome: Monitoring/Evaluating progress     Problem: At Risk for Injury Due to Fall  Goal: Patient does not fall  Outcome: Monitoring/Evaluating progress  Goal: Takes action to control condition specific risks  Outcome: Monitoring/Evaluating progress  Goal: Verbalizes understanding of fall-related injury personal risks  Description: Document education using the patient education activity  Outcome: Monitoring/Evaluating progress     Problem: Pain  Goal: Acceptable pain level achieved/maintained at rest using appropriate pain scale for the patient  Outcome: Monitoring/Evaluating progress  Goal: Acceptable pain level achieved/maintained with activity using appropriate pain scale for the patient  Outcome: Monitoring/Evaluating progress  Goal: Acceptable pain level achieved/maintained without oversedation  Outcome: Monitoring/Evaluating progress     Problem: Impaired Physical Mobility  Goal: Functional status is maintained or returned to baseline during hospitalization  Outcome: Monitoring/Evaluating progress  Goal: Tolerates activity for discharge setting with no abnormal symptoms  Outcome: Monitoring/Evaluating progress     Problem: Skin Integrity Alteration  Goal: Skin remains intact with no new/deterioration of wound or pressure injury  Outcome: Monitoring/Evaluating progress  Goal: Participates in wound care activities  Outcome: Monitoring/Evaluating progress      Addended by: LISA HERNANDEZ on: 2/12/2025 07:15 AM     Modules accepted: Level of Service

## 2025-03-02 LAB
ERYTHROCYTE [DISTWIDTH] IN BLOOD BY AUTOMATED COUNT: 46.3 FL (ref 35.9–50)
HCT VFR BLD AUTO: 32.5 % (ref 37–47)
HGB BLD-MCNC: 10.7 G/DL (ref 12–16)
MCH RBC QN AUTO: 29.3 PG (ref 27–33)
MCHC RBC AUTO-ENTMCNC: 32.9 G/DL (ref 32.2–35.5)
MCV RBC AUTO: 89 FL (ref 81.4–97.8)
PLATELET # BLD AUTO: 210 K/UL (ref 164–446)
PMV BLD AUTO: 10.3 FL (ref 9–12.9)
RBC # BLD AUTO: 3.65 M/UL (ref 4.2–5.4)
WBC # BLD AUTO: 18.3 K/UL (ref 4.8–10.8)

## 2025-03-02 PROCEDURE — 85027 COMPLETE CBC AUTOMATED: CPT

## 2025-03-02 PROCEDURE — A9270 NON-COVERED ITEM OR SERVICE: HCPCS | Performed by: OBSTETRICS & GYNECOLOGY

## 2025-03-02 PROCEDURE — 770002 HCHG ROOM/CARE - OB PRIVATE (112)

## 2025-03-02 PROCEDURE — 36415 COLL VENOUS BLD VENIPUNCTURE: CPT

## 2025-03-02 PROCEDURE — 700102 HCHG RX REV CODE 250 W/ 637 OVERRIDE(OP): Performed by: OBSTETRICS & GYNECOLOGY

## 2025-03-02 RX ADMIN — IBUPROFEN 800 MG: 800 TABLET, FILM COATED ORAL at 09:04

## 2025-03-02 RX ADMIN — DOCUSATE SODIUM 100 MG: 100 CAPSULE, LIQUID FILLED ORAL at 20:22

## 2025-03-02 RX ADMIN — PRENATAL WITH FERROUS FUM AND FOLIC ACID 1 TABLET: 3080; 920; 120; 400; 22; 1.84; 3; 20; 10; 1; 12; 200; 27; 25; 2 TABLET ORAL at 07:31

## 2025-03-02 ASSESSMENT — EDINBURGH POSTNATAL DEPRESSION SCALE (EPDS)
THE THOUGHT OF HARMING MYSELF HAS OCCURRED TO ME: NEVER
I HAVE LOOKED FORWARD WITH ENJOYMENT TO THINGS: RATHER LESS THAN I USED TO
I HAVE BEEN ABLE TO LAUGH AND SEE THE FUNNY SIDE OF THINGS: AS MUCH AS I ALWAYS COULD
I HAVE FELT SAD OR MISERABLE: NOT VERY OFTEN
I HAVE BEEN SO UNHAPPY THAT I HAVE HAD DIFFICULTY SLEEPING: NOT VERY OFTEN
I HAVE BLAMED MYSELF UNNECESSARILY WHEN THINGS WENT WRONG: NOT VERY OFTEN
I HAVE FELT SCARED OR PANICKY FOR NO GOOD REASON: NO, NOT MUCH
I HAVE BEEN SO UNHAPPY THAT I HAVE BEEN CRYING: ONLY OCCASIONALLY
THINGS HAVE BEEN GETTING ON TOP OF ME: NO, MOST OF THE TIME I HAVE COPED QUITE WELL
I HAVE BEEN ANXIOUS OR WORRIED FOR NO GOOD REASON: YES, SOMETIMES

## 2025-03-02 ASSESSMENT — PAIN DESCRIPTION - PAIN TYPE: TYPE: ACUTE PAIN

## 2025-03-02 NOTE — PROGRESS NOTES
OB Progress Note    No pressure yet. FHT with baseline of 130's. Moderate variability present. Accelerations present. Decelerations absent. [place] with contractions every 3 min. SVE C/C/+1. Start pushing efforts.

## 2025-03-02 NOTE — LACTATION NOTE
This note was copied from a baby's chart.  Initial Consult:     History:  Renetta PETERS, is a 33 y/o  s/p  at 39+4 wks on 3/1/2025 at 1917.  MADDY HICKS, FRAN RN.    Baby boy, Rene, had BW 3040 g (6 lbs, 11.2 oz), AGA, IRINA(+).       History of BF:  Primip.     Report of Current Breastfeeding Status:  Renetta reports baby has been a bit sleepy since delivery, but seems to be more alert this a.m.  Renetta recorded 3 feedings since delivery last night, 15 mins duration. Denies breast/nipple tenderness, but reports breasts feel firmer today.     Breasts appear round/soft/symmetrical, nipples everted/intact/pliable.     She is cuddling baby skin-to-skin at time of visit. Baby has good color and tone and is lightly asleep. Voiding/stooling appropriate to DOL.     Breastfeeding Assistance:     Will attempt to revisit at next feeding for latch assist/assessment.      Provided breastfeeding education on: skin to skin, supply and demand, hunger cues, frequency/duration of breastfeeds, cluster feeding, shallow vs deep latch.     [facility] Breastfeeding Resources handout provided and outpatient lactation care and support [place] options reviewed.     [facility] pump form provided and discussed how to get pump through [facility]. Recommended not initiating pumping/bottle feeding EBM until after 4 wks of life unless supplementation medically indicated.     Encouraged to watch latch and hand expression videos on [employer].     1245:    Baby initially sleepy, awakened after skin-to-skin.    Demonstrated and taught Renetta how to perform hand expression. Yen able to hand express colostrum independently.     Assisted Yen to position baby at the left breast in the cross-cradle position. Taught Yen to place baby tummy to tummy and nipple to nose, how to wedge her breast, and hand express to tease baby to a wide gape and achieve deep latch.      Infant latched deep to breast and suckled with nutritive pattern, audible  swallows noted.  Yen denied pain with latch.    Also assisted Yen to latch baby in football hold at right breast with a deep latch and sustained nutritive pattern with audible swallows.      Provided breastfeeding education on: skin to skin, supply and demand, hunger cues, frequency/duration of breastfeeds, cluster feeding, shallow vs deep latch, and nutritive vs non-nutritive suck.    PLAN:    Skin to skin when either parent awake and alert.    Offer breast whenever hunger cues noted.    If baby sleeps more than 3 hours, wake baby and offer breast.    If baby not waking for feeding at least every 3 hours, hand express and spoon/cup feed back EBM to baby.    Watch latch and hand expression videos on [employer].

## 2025-03-02 NOTE — PROGRESS NOTES
1930: Report received for RN lunch break. Report received from Mickie JEFFREY RN  2000: Mickie JEFFREY RN back from break; care relinquished

## 2025-03-02 NOTE — ANESTHESIA POSTPROCEDURE EVALUATION
Patient: Renetta Mcmahan    Procedure Summary       Date: 03/01/25 Room / Location:     Anesthesia Start: 1049 Anesthesia Stop: 1917    Procedure: Labor Epidural Diagnosis:     Scheduled Providers:  Responsible Provider: Jermaine Rowan M.D.    Anesthesia Type: epidural ASA Status: 2            Final Anesthesia Type: epidural  Last vitals  BP   Blood Pressure: 98/57    Temp   36.9 °C (98.4 °F)    Pulse   83   Resp   16    SpO2   98 %      Anesthesia Post Evaluation    Patient location during evaluation: PACU  Patient participation: complete - patient participated  Level of consciousness: awake and alert  Pain score: 0    Airway patency: patent  Anesthetic complications: no  Cardiovascular status: hemodynamically stable  Respiratory status: acceptable  Hydration status: euvolemic    PONV: none          No notable events documented.     Nurse Pain Score: 4 (NPRS)

## 2025-03-02 NOTE — CARE PLAN
Problem: Knowledge Deficit - Postpartum  Goal: Patient will verbalize and demonstrate understanding of self and infant care  Outcome: Progressing     Problem: Psychosocial - Postpartum  Goal: Patient will verbalize and demonstrate effective bonding and parenting behavior  Outcome: Progressing   The patient is Stable - Low risk of patient condition declining or worsening    Shift Goals  Clinical Goals: maintain normal lochia  Patient Goals: pain controlled, rest  Family Goals: suppport    Progress made toward(s) clinical / shift goals:  Patient is caring for self and     Patient is not progressing towards the following goals:

## 2025-03-02 NOTE — CARE PLAN
Problem: Altered physiologic condition related to immediate post-delivery state and potential for bleeding/hemorrhage  Goal: Patient physiologically stable as evidenced by normal lochia, palpable uterine involution and vital signs within normal limits  Outcome: Progressing     Problem: Alteration in comfort related to episiotomy, vaginal repair and/or after birth pains  Goal: Patient verbalizes acceptable pain level  Outcome: Progressing   The patient is Stable - Low risk of patient condition declining or worsening    Shift Goals: maintain normal lochia  Clinical Goals: maintain normal lochia  Patient Goals: pain controlled, rest  Family Goals: suppport    Progress made toward(s) clinical / shift goals: Pt verbalized acceptable level of pain     Patient is not progressing towards the following goals:

## 2025-03-02 NOTE — PROGRESS NOTES
2200 Admitted from L and D via wheelchair, Pt oriented to the room, Safety precaution and plan of care discussed, Assessment done fundus firm with light lochia, abdomen soft non distended, Denies pain at this time, Admission care rendered.

## 2025-03-02 NOTE — L&D DELIVERY NOTE
"Delivery Summary    Renetta Mcmahan is a 33 yo  who presented at 39w4d with spontaneous rupture of membranes and labor. She received an epidural for anesthesia. She progressed to complete dilation where with pushing efforts she underwent a spontaneous vaginal delivery of a viable male infant \"Rene\" in JESSEE position with APGARS 9/9. The head delivered atraumatically. The anterior shoulder delivered with gentle downward pressure and the remainder of the body followed. The infant was placed on the maternal chest. The cord was clamped and cut after a one minute delay. The placenta was delivered with gentle traction. The uterus initially failed to firm with fundal pressure and pitocin. Her bladder was drained and good firming was noted. A vulvovaginal examination was performed and a right labial and second degree laceration were repaired with 3.0 chromic in the usual fashion.  Sponge and needle counts were correct x2.    EBL: 600cc  Anesthesia: epidural  Complications: None    Herbie Kaur M.D.    "

## 2025-03-02 NOTE — PROGRESS NOTES
"OB Post Partum  Note    Pain is controlled.  Normal lochia.  Ambulating without dizziness.  Tolerating p.o.  Voiding without difficulty.  Infant is having difficulties with breast-feeding.    /59   Pulse 66   Temp 36.6 °C (97.8 °F) (Temporal)   Resp 18   Ht 1.6 m (5' 2.99\")   Wt 60.6 kg (133 lb 9.6 oz)   LMP 05/28/2024 (Exact Date)   SpO2 94%   Breastfeeding Yes   BMI 23.67 kg/m²       Gen: NAD, resting comfortably  GI: soft, non tender to palpation  : fundus firm and below umbilicus  Ext: no edema    Recent Labs     03/01/25  0845 03/02/25  0538   WBC 15.4* 18.3*   RBC 5.22 3.65*   HEMOGLOBIN 15.2 10.7*   HEMATOCRIT 47.0 32.5*   MCV 90.0 89.0   MCH 29.1 29.3   RDW 47.1 46.3   PLATELETCT 221 210   MPV 10.0 10.3   NEUTSPOLYS 84.90*  --    LYMPHOCYTES 9.10*  --    MONOCYTES 4.40  --    EOSINOPHILS 0.20  --    BASOPHILS 0.40  --        Assessment:  Post partum day #1     Plan:  Routine post partum care  Anticipate discharge on post partum day 2 to work on breast feeding    Herbie Kaur M.D.   "

## 2025-03-02 NOTE — ANESTHESIA TIME REPORT
Anesthesia Start and Stop Event Times       Date Time Event    3/1/2025 1049 Ready for Procedure     1049 Anesthesia Start     1917 Anesthesia Stop          Responsible Staff  03/01/25      Name Role Begin End    Jermaine Rowan M.D. Anesth 1049 1917          Overtime Reason:  no overtime (within assigned shift)    Comments:

## 2025-03-03 ENCOUNTER — PHARMACY VISIT (OUTPATIENT)
Dept: PHARMACY | Facility: MEDICAL CENTER | Age: 35
End: 2025-03-03
Payer: COMMERCIAL

## 2025-03-03 ENCOUNTER — TELEPHONE (OUTPATIENT)
Dept: OBGYN | Facility: CLINIC | Age: 35
End: 2025-03-03
Payer: COMMERCIAL

## 2025-03-03 VITALS
HEART RATE: 72 BPM | TEMPERATURE: 97.6 F | DIASTOLIC BLOOD PRESSURE: 59 MMHG | WEIGHT: 133.6 LBS | BODY MASS INDEX: 23.67 KG/M2 | RESPIRATION RATE: 18 BRPM | HEIGHT: 63 IN | SYSTOLIC BLOOD PRESSURE: 101 MMHG | OXYGEN SATURATION: 98 %

## 2025-03-03 PROCEDURE — RXMED WILLOW AMBULATORY MEDICATION CHARGE: Performed by: OBSTETRICS & GYNECOLOGY

## 2025-03-03 PROCEDURE — A9270 NON-COVERED ITEM OR SERVICE: HCPCS | Performed by: OBSTETRICS & GYNECOLOGY

## 2025-03-03 PROCEDURE — 700102 HCHG RX REV CODE 250 W/ 637 OVERRIDE(OP): Performed by: OBSTETRICS & GYNECOLOGY

## 2025-03-03 RX ORDER — IBUPROFEN 800 MG/1
800 TABLET, FILM COATED ORAL EVERY 8 HOURS PRN
Qty: 30 TABLET | Refills: 0 | Status: SHIPPED | OUTPATIENT
Start: 2025-03-03

## 2025-03-03 RX ORDER — PSEUDOEPHEDRINE HCL 30 MG
100 TABLET ORAL 2 TIMES DAILY PRN
Qty: 30 CAPSULE | Refills: 0 | Status: SHIPPED | OUTPATIENT
Start: 2025-03-03

## 2025-03-03 RX ORDER — BREAST PUMP
1 EACH MISCELLANEOUS CONTINUOUS
Qty: 1 EACH | Refills: 0 | Status: SHIPPED | OUTPATIENT
Start: 2025-03-03

## 2025-03-03 RX ADMIN — BISACODYL 10 MG: 10 SUPPOSITORY RECTAL at 16:59

## 2025-03-03 RX ADMIN — IBUPROFEN 800 MG: 800 TABLET, FILM COATED ORAL at 17:00

## 2025-03-03 RX ADMIN — PRENATAL WITH FERROUS FUM AND FOLIC ACID 1 TABLET: 3080; 920; 120; 400; 22; 1.84; 3; 20; 10; 1; 12; 200; 27; 25; 2 TABLET ORAL at 07:36

## 2025-03-03 ASSESSMENT — PAIN DESCRIPTION - PAIN TYPE: TYPE: ACUTE PAIN

## 2025-03-03 NOTE — CARE PLAN
Problem: Altered physiologic condition related to immediate post-delivery state and potential for bleeding/hemorrhage  Goal: Patient physiologically stable as evidenced by normal lochia, palpable uterine involution and vital signs within normal limits  Outcome: Progressing     Problem: Alteration in comfort related to episiotomy, vaginal repair and/or after birth pains  Goal: Patient verbalizes acceptable pain level  Outcome: Progressing   The patient is Stable - Low risk of patient condition declining or worsening    Shift Goals: pain controlled, rest  Clinical Goals: maintain normal lochia  Patient Goals: pain controlled, rest  Family Goals: support    Progress made toward(s) clinical / shift goals:  pt verbalized acceptable level of pain     Patient is not progressing towards the following goals:

## 2025-03-03 NOTE — DISCHARGE SUMMARY
"Discharge Summary    Admission Date: 3/1/25    Discharge Date: 3/3/25    Diagnosis:  IUP at 39+4 weeks  Labor  GBS neg  SROM    Procedures:      Subjective: Pt doing well. Pain controlled. Normal lochia. Eating, voiding and ambulating without difficulty. Baby doing well. Breast feeding. Desires discharge home today.    /59   Pulse 72   Temp 36.4 °C (97.6 °F) (Temporal)   Resp 18   Ht 1.6 m (5' 2.99\")   Wt 60.6 kg (133 lb 9.6 oz)   LMP 2024 (Exact Date)   SpO2 98%   Breastfeeding Yes   BMI 23.67 kg/m²     GEN: NAD, comfortable  GI: soft, NT, ND  Gu: fundus firm, nontender, and below umbilicus  EXT: nontender, no edema    Hospital Course: The patient presented to L&D with contractions and was admitted for labor management. She received epidural anesthesia and progressed to complete dilation. See notes and delivery report for full details. She delivered a healthy infant without complications. Postpartum mother and infant were in stable condition and meeting all milestones. She was discharged to home on PPD#2.    Discharge Instructions   1. Diet : general  2. Activity: Pelvic rest for 6 weeks    Current Outpatient Medications   Medication Sig Dispense Refill    docusate sodium 100 MG Cap Take 100 mg by mouth 2 times a day as needed for Constipation. 30 Capsule 0    ibuprofen (MOTRIN) 800 MG Tab Take 1 Tablet by mouth every 8 hours as needed for Mild Pain or Moderate Pain. 30 Tablet 0    Misc. Devices (BREAST PUMP) Misc 1 Each continuous. Please dispense one dual electric breast pump with supplies 1 Each 0       Follow up: 6 weeks postpartum with Dr. Sutton    Complications: none      Kylah Campa M.D.    "

## 2025-03-03 NOTE — PROGRESS NOTES
2000 Pt doing well bonding with baby, Assessment done fundus firm with scant lochia, abdomen soft non distended, voiding without difficulty, denies pain at this time, Needs attended.

## 2025-03-03 NOTE — LACTATION NOTE
This note was copied from a baby's chart.  Follow-Up Consult:     History:  Renetta PETERS, is a 33 y/o  s/p  at 39+4 wks on 3/1/2025 at 1917.  MADDY HICKS, FRAN RN.     Baby boy, Rene, had BW 3040 g (6 lbs, 11.2 oz), AGA, with a loss of 4.44% at 24HOL. IRINA(+).       History of BF:  Primip.     Report of Current Breastfeeding Status:  Renetta reports baby has been clusterfeeding overnight.  Renetta recorded 6 feedings in the last 24h, 15-60 mins duration.     Breasts appear round/soft/symmetrical, nipples everted/pliable, left nipple intact, right nipple is sore with reddened tip.      Baby is swaddled x 2 with hat in crib at bedside at time of visit. Baby has good color and tone, appears mildly jaundiced and is lightly asleep. Voiding/stooling appropriate to DOL.     Couplet scheduled for possible discharge today.      Breastfeeding Assistance:      Will attempt to revisit at next feeding for latch assist/assessment on right.      [facility] Breastfeeding Resources handout provided and outpatient lactation care and support [place] options reviewed yesterday. Encouraged to return for Tuesday a.m. support group if nipple discomfort worsens overnight.      Encouraged to watch latch and sore nipples videos on [employer] [facility].     1000:    F/u for latch assessment. Observed L9 latch in cross-cradle at right breast. Renetta reports she was able to achieve deep latch independently and it is comfortable. Nutritive pattern, quiet swallows noted.     Teaching about supply/demand, clusterfeeding, pacifier use recommendations (avoid until 4 wks of life).     Encouraged Renetta to reach out via bedside RN if she would like latch assist later or has additional questions for lactation.    PLAN:    Skin to skin when either parent awake and alert.    Offer breast whenever hunger cues noted.    If baby sleeps more than 3 hours, wake baby and offer breast.    If baby not waking for feeding at least every 3 hours, hand express and spoon/cup feed  back EBM to baby.    Watch latch and sore nipples videos on [employer] [facility].

## 2025-03-03 NOTE — DISCHARGE INSTRUCTIONS
PATIENT DISCHARGE EDUCATION INSTRUCTION SHEET    REASONS TO CALL YOUR OBSTETRICIAN  Persistent fever, shaking, chills (Temperature higher than 100.4) may indicate you have an infection  Heavy bleeding: soaking more than 1 pad per hour; Passing clots an egg-sized clot or bigger may mean you have an postpartum hemorrhage  Foul odor from vagina or bad smelling or discolored discharge or blood  Breast infection (Mastitis symptoms); breast pain, chills, fever, redness or red streaks, may feel flu like symptoms  Urinary pain, burning or frequency  Incision that is not healing, increased redness, swelling, tenderness or pain, or any pus from episiotomy or  site may mean you have an infection  Redness, swelling, warmth, or painful to touch in the calf area of your leg may mean you have a blood clot  Severe or intensified depression, thoughts or feelings of wanting to hurt yourself or someone else   Pain in chest, obstructed breathing or shortness of breath (trouble catching your breath) may mean you are having a postpartum complication. Call your provider immediately   Headache that does not get better, even after taking medicine, a bad headache with vision changes or pain in the upper right area of your belly may mean you have high blood pressure or post birth preeclampsia. Call your provider immediately    HAND WASHING  All family and friends should wash their hands:  Before and after holding the baby  Before feeding the baby  After using the restroom or changing the baby's diaper    WOUND CARE  Ask your physician for additional care instructions. In general:   Incision:  May shower and pat incision dry   Keep the incision clean and dry  There should not be any opening or pus from the incision  Continue to walk at home 3 times a day   Do NOT lift anything heavier than your baby (over 10 pounds)  Encourage family to help participate in care of the  to allow rest and mom time to  heal  Episiotomy/Laceration  May use talon-spray bottle, witch hazel pads and dermaplast spray for comfort  Use talon-spray bottle after urinating to cleanse perineal area  To prevent burning during urination spray talon-water bottle on labial area   Pat perineal area dry until episiotomy/laceration is healed  Continue to use talon-bottle until bleeding stops as needed  If have a 2nd degree laceration or greater, a Sitz bath can offer relief from soreness, burning, and inflammation   Sitz Bath   Sit in 6 inches of warm water and soak laceration as needed until the laceration heals    VAGINAL CARE AND BLEEDING  Nothing inside vagina for 6 weeks:   No sexual intercourse, tampons or douching  Bleeding may continue for 2-4 weeks. Amount and color may vary  Soaking 1 pad or more in an hour for several hours is considered heavy bleeding  Passing large egg sized blood clots can be concerning  If you feel like you have heavy bleeding or are having increasing amount of blood clots call your Obstetrician immediately  If you begin feeling faint upon standing, feeling sick to your stomach, have clammy skin, a really fast heartbeat, have chills, start feeling confused, dizzy, sleepy or weak, or feeling like you're going to faint call your Obstetrician immediately    HYPERTENSION   Preeclampsia or gestational hypertension are types of high blood pressure that only pregnant women can get. It is important for you to be aware of symptoms to seek early intervention and treatment. If you have any of these symptoms immediately call your Obstetrician    Vision changes or blurred vision   Severe headache or pain that is unrelieved with medication and will not go away  Persistent pain in upper abdomen or shoulder   Increased swelling of face, feet, or hands  Difficulty breathing or shortness of breath at rest  Urinating less than usual    URINATION AND BOWEL MOVEMENTS  Eating more fiber (bran cereal, fruits, and vegetables) and drinking  "plenty of fluids will help to avoid constipation  Urinary frequency and urgency after childbirth is normal  If you experience any urinary pain, burning or frequency call your provider    BIRTH CONTROL  It is possible to become pregnant at any time after delivery and while breastfeeding  Plan to discuss a method of birth control with your physician at your post delivery follow up visit    POSTPARTUM BLUES  During the first few days after birth, you may experience a sense of the \"blues\" which may include impatience, irritability or even crying. These feelings come and go quickly. However, as many as 1 in 10 women experience emotional symptoms known as postpartum depression.     POSTPARTUM DEPRESSION    May start as early as the second or third day after delivery or take several weeks or months to develop. Symptoms of \"blues\" are present, but are more intense: Crying spells; loss of appetite; feelings of hopelessness or loss of control; fear of touching the baby; over concern or no concern at all about the baby; little or no concern about your own appearance/caring for yourself; and/or inability to sleep or excessive sleeping. Contact your Obstetrician if you are experiencing any of these symptoms     PREVENTING SHAKEN BABY  If you are angry or stressed, PUT THE BABY IN THE CRIB, step away, take some deep breaths, and wait until you are calm to care for the baby. DO NOT SHAKE THE BABY. You are not alone, call a supporter for help.  [facility] 24/7 crisis call line (944-765-9048) or (1-220.934.6052)  You can also text them, text \"ANSWER\" (761117)    Breastfeeding Plan:       Breastfeed baby on demand based on early feeding cues at least 8x in 24hrs. It is not recommended to let baby sleep longer than 4 hours between feedings and if sleepy, place skin to skin to promote feeding interest and milk production.  Baby's usually feed more frequently and longer when skin to skin with mother. Continue to hand express " prior to latch.       Expect cluster feeding as this is normal during early days of life and growth spurts. Baby may feed more frequently and for longer periods of time.  Its normal for a baby to want to feed up to 18x in24 hr period. This frequent feeding is activating hormones to make mature breastmilk.  Breastmilk should increase in volume by postpartum day 4.  Signs and symptoms of engorgement might occur around that time.     It is not recommended to use pacifiers or supplementing with formula until breast feeding and milk production is well established or at least 4 weeks.    Make sure infant is getting enough by ensuring frequent feedings as well as looking for transitioning stools from dark meconium to bright yellow/green seedy loose stools by day 5.     Follow up with PEDS PCP as scheduled for weight checks and to make sure feeding is progressing appropriately.    Engorgement care: frequent breastfeeding, gentle (like petting a cat) massage towards axilla, cool compresses, hand express to comfort and to soften nipple area for latch, NSAIDs (like ibuprofen) as prescribed by OB for postpartum pain relief. Contact your OB provider if you develop a hard, warm, reddened lump especially if you also have a fever, chills, aches as this is concerning for mastitis.      Create a comfortable and relaxing environment for breastfeeding, minimizing distractions and ensuring proper positioning for mom and baby. Hold baby skin to skin with mom or dad as much as possible when you are awake and alert, skin to skin promotes bonding and breastfeeding success.      Breastfeeding support is available!      [facility] holds [facility] free [facility] [place] every Thursday from 11am-12pm lead by a Lactation Consultant.  No appointment necessary.  Excludes holidays. Bring your baby!  [facility]  3rd floor conference room.

## 2025-03-03 NOTE — PROGRESS NOTES
0700- report received from NOC RN. POC discussed including feeding intervals, I+O documentation, latch support, lochia changes, ambulation, infant temperature management including STS and layering, weights, VS intervals, and discharge planning. Medications and other comfort measures discussed. Call light in reach. Plan to discharge home today if medically cleared.

## 2025-03-04 NOTE — PROGRESS NOTES
1830- Discharge education provided and signed. All printed topics discussed. Emphasis provided on feeding plan, bleeding, and when to call doctor. follow up appointments discussed. All questions answered. No further needs at this time.